# Patient Record
Sex: MALE | Race: WHITE | NOT HISPANIC OR LATINO | URBAN - METROPOLITAN AREA
[De-identification: names, ages, dates, MRNs, and addresses within clinical notes are randomized per-mention and may not be internally consistent; named-entity substitution may affect disease eponyms.]

---

## 2020-11-20 ENCOUNTER — OFFICE VISIT (OUTPATIENT)
Dept: URBAN - METROPOLITAN AREA TELEHEALTH 2 | Facility: TELEHEALTH | Age: 25
End: 2020-11-20

## 2021-06-29 ENCOUNTER — DASHBOARD ENCOUNTERS (OUTPATIENT)
Age: 26
End: 2021-06-29

## 2021-07-06 ENCOUNTER — WEB ENCOUNTER (OUTPATIENT)
Dept: URBAN - METROPOLITAN AREA CLINIC 98 | Facility: CLINIC | Age: 26
End: 2021-07-06

## 2021-07-06 ENCOUNTER — OFFICE VISIT (OUTPATIENT)
Dept: URBAN - METROPOLITAN AREA CLINIC 98 | Facility: CLINIC | Age: 26
End: 2021-07-06
Payer: COMMERCIAL

## 2021-07-06 DIAGNOSIS — R19.7 DIARRHEA, UNSPECIFIED TYPE: ICD-10-CM

## 2021-07-06 DIAGNOSIS — R14.0 BLOATING: ICD-10-CM

## 2021-07-06 DIAGNOSIS — K58.9 IBS (IRRITABLE BOWEL SYNDROME): ICD-10-CM

## 2021-07-06 DIAGNOSIS — R10.9 ABDOMINAL PAIN: ICD-10-CM

## 2021-07-06 DIAGNOSIS — R19.4 CHANGE IN BOWEL HABITS: ICD-10-CM

## 2021-07-06 PROCEDURE — 99214 OFFICE O/P EST MOD 30 MIN: CPT | Performed by: INTERNAL MEDICINE

## 2021-07-06 NOTE — HPI-TODAY'S VISIT:
24 yo male for 1.5 year f/u of diarrhea that comes and goes. 2 weeks ago had bad pain and diarrhea for a week after his second vax. Got diarrhea when he had Covid. Pain and diarrhea have gotten better. Had THC gummies there and got better.  No fever.   Diarrhea with dairy and fat or something spicy.   Eggs.  Generally agws. 2nd covid vax ------- June 18. Diarrhea started day after.  Diarrhea incr to 3 x a day. Now it is 0 diarrhea.   Or once a week.   ================================= 11/2019 Diarrhea    History Of Present Illness  This is a scheduled follow-up appointment for this patient, a     23 yo male   1 month f/u of diarrhea.  Doing better by 50%.  Less diarrhea.  Less pain, over quicker  Had C diff may-aug 2018.  All labs are normal from 10/23.  C diff pcr negative.  Is better and may be time to resolutio.  Generic probiotic may be helping.  Discussed avoiding various food addives and sauces    ==== 10/23/19  23 yo male with  Colonoscopy july 2018 due to months of diarrhea that was unexplained. EGD and Colon were no by Dr. Jorge Meyer in [place] Then he checked C diff and it was positive in September and then Vancomycin qid for 2 weeks. Rechecked C diff agian and was net and rechecked in [place] in May of 2019 and was neg again.  Current syx include bloating and feels like he has to go after every meal. Certain meals cause abd pain.  Lost 30# with C diff and has a hard time gaining wt. LLQ abd pain daily.  2 stools a day. No fcs.  EIM - chronic knee and hip aches.x 3 years.   Had a pilonidal cyst remoed by general surgeon in [place] in June of 2017 and got syx of diarrhea in march of 2018-diarrhea.  Travelled to [place] and [place] and a friend gave him Cipro in [place] in June and took cipro for 4 days.  Diarrhea is once  Dicyclomine  Plan== C diff PCR  Probiotic -  O/S records  [facility]  Pepto fpr a few days      Past Medical History  Disease Name Date Onset Notes  Abdominal pain --  --   Bloating --  --   Change in bowel habits --  --   Gas --  --   IBS (irritable bowel syndrome) --  --   Influenza Immunization --  --       Past Surgical History  Procedure Name Date Notes  Colonoscopy 7/9/2018 11/20/2019 - 10/23/2019 -   Cystectomy 6/2017 10/23/2019 -   EGD 7/9/2018 11/20/2019 - 10/23/2019 -       Allergy List  Allergen Name Date Reaction Notes  NO KNOWN DRUG ALLERGIES --  --  --   No Known Environmental Allergies --  --  --   No Known Food Allergies --  --  --       Family Medical History  Disease Name Relative/Age Notes  Family history of acid reflux/GERD Brother/28  --   Family history of prostate cancer Father/70  --       Social History  Finding Status Start/Stop Quantity Notes  Alcohol Current some day --/-- --  11/20/2019 - 10/23/2019 -   Denies illicit substance abuse --  --/-- --  11/20/2019 - 10/23/2019 -   Tobacco Never --/-- --  11/20/2019 - 10/23/2019 -       Review of Systems  ConstitutionalDenies : body aches, chills, fatigue, fever, loss of appetite, malaise, night sweats, weight gain, weight loss  EyesDenies : blurred vision, visual changes  HENTDenies : Ear Pain/Ringing, hearing loss, Mouth Ulcers/Sores, nose bleeds, problems with gums/teeth, trouble swallowing  CardiovascularDenies : chest pain, leaky heart valves, heart murmur, heart racing/skipping, high blood pressure, palpitations  RespiratoryDenies : chronic cough, shortness of breath, wheezing or asthma symptoms  GastrointestinalDenies : Abdominal Pain/discomfort, Anal/Rectal Pain or Itching, Anal Spasm, Black Stool, Bloating/belching/gaseouness, Change of Bowel Habit, Constipation, Diarrhea/Loose Stool, Difficulty in Swallowing, Heartburn/esophageal reflux, Hemorrhoids, Indigestion, Mucus in Stool, Nausea/vomiting, Rectal Bleeding, Unintentional Weight Loss  GenitourinaryDenies : Blood in Urine, Burning/pain with Urination, frequency, Recent/frequent UTI, Kidney Stones  IntegumentDenies : itching/dry skin, jaundice, rashes, bumps or sores  NeurologicDenies : headaches, dizziness/vertigo, head trauma/injury, recent numbness/weakness, seizures  MusculoskeletalDenies : back pain, decreased range of motion, joint pain/arthritis, Problems Walking/calf or leg pain  EndocrineDenies : bruise easily, excessive thirst, heat/cold intolerance, history of high or low blood sugar  PsychiatricDenies : anxiety, changes in sleep pattern, depression, loss of memory  Heme-LymphDenies : Bleeding Problems, Enlarged Nodes/Swolen Glands, excessive bruising, history of anemia  Allergic-ImmunologicDenies : seasonal allergies    Vitals  Date Time BP Position Site L\R Cuff Size HR RR TEMP (F) WT  HT  BMI kg/m2 BSA m2 O2 Sat HC     11/20/2019 12:25 PM 93/62 Sitting    64 - R  97.2 135lbs 0oz 5'  11" 18.83 1.75        Physical Examination  ConstitutionalAppearance : Well nourished, well developed patient in no distress. Ambulating without difficulty.  Ability to Communicate : Normal communication ability  EyesConjunctivae and Eyelids : Conjunctivae and eyelids appear normal  Sclerae : White without injection  HENTHead :  Inspection : Normocephalic and atraumatic  Face :  Inspection : Face within normal limits  Ears :  External Ears : External ears within normal limits  Nose/Nasopharynx :  External Nose : External nose normal appearance, nares patent, no nasal discharge  Mouth and Throat :  General : Oral cavity appearance normal, breath odor normal  Lips : Appearance normal  NeckInspection and Palpation : Normal appearance without tenderness on palpation or deformities, trachea midline  Thyroid : Normal size without tenderness, nodules or masses  Jugular Veins : no JVD  ChestInspection of Chest : No lesions, deformities or traumatic injuries present. No significant scars are present.  Respiratory Effort : Breathing is unlabored without accessory muscle use  Palpation of Chest : Chest wall non-tender with no masses present. Tactile fremitus is normal  Auscultation : Normal breath sounds  CardiovascularHeart :  Auscultation : Heart rate is regular with normal rhythm. No murmurs are heard. No edema.  GastrointestinalAbdominal Exam : Abdomen soft without rigidity or guarding, abdomen non-tender to palpation, normal bowel sounds, no masses present, non-distended  Liver and Spleen : No hepatomegaly present. Liver is non-tender to palpation and spleen is not palpable.  LymphaticNeck : No lymphadenopathy present  Axillae : No lymphadenopathy present  Groin : No lymphadenopathy present  MusculoskeletalGait and Station : Normal Gait, normal station  Neck/Spine/Pelvis : No tenderness of deformities present.  SkinGeneral Inspection : No rashes, lesions or areas of discoloration present. Skin turgor is normal.  General Palpation : No abnormalities, masses or tenderness on palpation.  Neurologic and PsychiatricOrientation : Oriented to person, place and time  Sensation : Normal sensation  Memory : Short and long term memory intact.  Mood and Affect : Normal mood with an appropriate affect      Assessment  Abdominal pain, generalized     789.07/R10.84  Diarrhea     787.91/R19.7    Plan  OrdersPatient not identified as an unhealthy alcohol user when screene () - - 11/20/2019  Influenza immunization administered or previously received () - - 11/20/2019  BMI is documented within normal parameters and no follow-up plan () - - 11/20/2019  Current tobacco non-user (CAD, cap, COPD, PV) (dm) (IBD) (1036F, ) - - 11/20/2019  Colorectal cancer screening results documented and reviewed (PV) (3017F) - - 11/20/2019  Documentation of current medications. () - - 11/20/2019  MedicationsMedications have been Reconciled  Transition of Care or Provider Policy  InstructionsI have documented a list of current medications and reviewed it with the patient.  I encouraged the patient to diet and exercise.  Electronically Identified Patient Education Materials Provided Electronically  DispositionCall or Return if symptoms worsen or persist.  Return To Clinic in 1 year    Flu shot.  Conservative approach from here on  Probiotic BID  Avoid antibiotics and recurrent C diff.         Electronically Signed by: Thang Manjarrez MD -Author on November 20, 2019 12:41:09 PM

## 2021-07-07 LAB
A/G RATIO: 1.8
ALBUMIN: 4.8
ALKALINE PHOSPHATASE: 64
AST (SGOT): 47
BASO (ABSOLUTE): 0.1
BASOS: 1
BILIRUBIN, DIRECT: 0.19
BILIRUBIN, INDIRECT: 0.41
BILIRUBIN, TOTAL: 0.6
BUN/CREATININE RATIO: 15
BUN: 16
C-REACTIVE PROTEIN, QUANT: <1
CALCIUM: 9.7
CARBON DIOXIDE, TOTAL: 23
CHLORIDE: 102
CREATININE: 1.05
EGFR IF AFRICN AM: 113
EGFR IF NONAFRICN AM: 98
EOS (ABSOLUTE): 0.1
EOS: 2
GLOBULIN, TOTAL: 2.6
GLUCOSE: 81
HEMATOCRIT: 46.6
HEMATOLOGY COMMENTS:: (no result)
HEMOGLOBIN: 15.4
IMMATURE CELLS: (no result)
IMMATURE GRANS (ABS): 0
IMMATURE GRANULOCYTES: 0
LDH: 220
LYMPHS (ABSOLUTE): 1.4
LYMPHS: 27
MCH: 30.6
MCHC: 33
MCV: 93
MONOCYTES(ABSOLUTE): 0.5
MONOCYTES: 9
NEUTROPHILS (ABSOLUTE): 3.2
NEUTROPHILS: 61
NRBC: (no result)
PLATELETS: 166
POTASSIUM: 4.5
PROTEIN, TOTAL: 7.4
RBC: 5.03
RDW: 12.4
SEDIMENTATION RATE-WESTERGREN: 2
SODIUM: 138
URIC ACID: 6.3
WBC: 5.3

## 2023-04-12 NOTE — PHYSICAL EXAM CHEST:
----- Message from Archie Esteves MD sent at 4/12/2023  7:16 AM CDT -----  Okay for amoxicillin 500 mg po tid x 5 days if having symptoms for UTI.  Can wait for culture results if asymptomatic.     no lesions, no deformities, no traumatic injuries, no significant scars are present, chest wall non-tender, no masses present, tactile fremitus is normal, breathing is unlabored without accessory muscle use., normal breath sounds.